# Patient Record
Sex: FEMALE | Race: OTHER | NOT HISPANIC OR LATINO | URBAN - METROPOLITAN AREA
[De-identification: names, ages, dates, MRNs, and addresses within clinical notes are randomized per-mention and may not be internally consistent; named-entity substitution may affect disease eponyms.]

---

## 2018-07-25 ENCOUNTER — APPOINTMENT (RX ONLY)
Dept: URBAN - METROPOLITAN AREA CLINIC 368 | Facility: CLINIC | Age: 38
Setting detail: DERMATOLOGY
End: 2018-07-25

## 2018-07-25 DIAGNOSIS — I83.9 ASYMPTOMATIC VARICOSE VEINS OF LOWER EXTREMITIES: ICD-10-CM

## 2018-07-25 PROBLEM — I83.93 ASYMPTOMATIC VARICOSE VEINS OF BILATERAL LOWER EXTREMITIES: Status: ACTIVE | Noted: 2018-07-25

## 2018-07-25 PROBLEM — L70.0 ACNE VULGARIS: Status: ACTIVE | Noted: 2018-07-25

## 2018-07-25 PROBLEM — F32.9 MAJOR DEPRESSIVE DISORDER, SINGLE EPISODE, UNSPECIFIED: Status: ACTIVE | Noted: 2018-07-25

## 2018-07-25 PROCEDURE — ? TREATMENT REGIMEN

## 2018-07-25 PROCEDURE — ? COUNSELING

## 2018-07-25 PROCEDURE — ? DIAGNOSIS COMMENT

## 2018-07-25 PROCEDURE — 99202 OFFICE O/P NEW SF 15 MIN: CPT

## 2018-07-25 ASSESSMENT — LOCATION ZONE DERM
LOCATION ZONE: LEG
LOCATION ZONE: FACE

## 2018-07-25 ASSESSMENT — LOCATION DETAILED DESCRIPTION DERM
LOCATION DETAILED: RIGHT DISTAL PRETIBIAL REGION
LOCATION DETAILED: RIGHT PROXIMAL PRETIBIAL REGION
LOCATION DETAILED: RIGHT MEDIAL FOREHEAD
LOCATION DETAILED: LEFT DISTAL PRETIBIAL REGION

## 2018-07-25 ASSESSMENT — LOCATION SIMPLE DESCRIPTION DERM
LOCATION SIMPLE: RIGHT PRETIBIAL REGION
LOCATION SIMPLE: RIGHT FOREHEAD
LOCATION SIMPLE: LEFT PRETIBIAL REGION

## 2018-07-25 NOTE — PROCEDURE: TREATMENT REGIMEN
Detail Level: Zone
Plan: Pt will make an appointment with Dr. Dimitry Welch for varicose veins\\nRecommended patient to wear compression stocking 20-30mmhg

## 2018-07-25 NOTE — HPI: VEIN EVALUATION
How Severe Is/Are Your Symptoms?: moderate
Is This A New Presentation, Or A Follow-Up?: Varicose Veins

## 2018-07-25 NOTE — PROCEDURE: DIAGNOSIS COMMENT
Detail Level: Simple
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 discussed not a great location for sclero, laser or filler due to risk of complications (necrosis and blindness). \\ndiscussed off label use of clonidine and rhofade. has baseline low BP which is now normal on wellbutrin, will double check if her PCP is ok with low dose clonidine. meantime she will come in forrhofade samples. \\n\\nattempted to reach PCP on 7/30/18 and 8/1/18. not able to reach. unable to LVM with pt as voicemail is full.